# Patient Record
Sex: FEMALE | Race: WHITE
[De-identification: names, ages, dates, MRNs, and addresses within clinical notes are randomized per-mention and may not be internally consistent; named-entity substitution may affect disease eponyms.]

---

## 2017-10-19 NOTE — HP
Amended report to enter cosigning physician.



CC:  Dr. Cheryl Mejia*

 

HISTORY AND PHYSICAL:

 

DATE OF ADMISSION/SURGERY:  10/27/17

 

PRIMARY CARE PHYSICIAN:  Cheryl Mejia MD

 

ATTENDING PHYSICIAN:  Gisella Atkins MD* (dictated by ESSIE Gallagher).

 

CHIEF COMPLAINT:  Microcalcifications of the right breast.

 

HISTORY OF PRESENT ILLNESS:  Ms. Holland is a pleasant 57-year-old female, who 
presented to our office at the [facility] for evaluation of right 
breast microcalcification.  The patient apparently had her annual mammogram in 
late August of this year that revealed a small cluster of microcalcification in 
the right breast.  The patient went on and had stereotactic biopsy, but 
unfortunately, she was not able to tolerate that prone position for an extended 
period of time for which that procedure was aborted and the patient was not 
able to get any tissue biopsy done.  Because of her incomplete stereotactic 
biopsy, she was referred back to our office for evaluation of 
microcalcification of the right breast.  The patient herself denies any breast 
symptoms.  She does her monthly self-breast exam on a regular basis and denies 
feeling any lumps, masses, skin changes or lesions, nipple discharge or any 
other concerns.  She otherwise is a relatively healthy, middle-aged female, who 
is G1, P1 with 1 live child, menarche at age 13, and had a surgical menopause 
via hysterectomy at age 41 as well as oophorectomies.  She used hormone 
replacement therapy for a period of time between 2004 and 2012.  She is a 
nonsmoker, who consumes caffeine on a moderate basis.  The patient was seen by 
Dr. Atkins earlier this month and given the need for tissue biopsy, we 
discussed with her proceeding with a mammogram-guided needle localization and 
excision of the right breast microcalcification.  After going through the 
rationale, indication, risks and benefits, the patient consented to proceed 
with surgery and presented to the office today, to answer some questions and 
for a formal history and physical prior to her anticipated surgery.  She denies 
any changes since her last office visit.

 

PAST MEDICAL HISTORY:  Her medical history is significant for rheumatoid 
arthritis. She denies any history of lung, liver, heart, or kidney disease.

 

PAST SURGICAL HISTORY:  Significant for hysterectomy with salpingo-oophorectomy 
at age 41.

 

CURRENT MEDICATIONS:

1.  Celebrex 1 capsule on a daily basis and has been avoiding ibuprofen and 
other NSAIDs.

 

2.  Plaquenil 200 mg 2 tablets daily.

3.  Melatonin 3 mg 1 tablet q.h.s.

4.  Voltaren 1% ointment apply as needed to the joints of the hand for pain.

5.  Plaquenil 200 mg by mouth daily.

6.  Melatonin.

 

ALLERGIES:  She is allergic to DEMEROL and COMPAZINE.

 

FAMILY HISTORY:  Significant for arthritis, had a sister with rheumatoid 
arthritis as well.

 

SOCIAL HISTORY:  The patient is a nonsmoker, who consumes alcohol rarely.  She 
exercises regularly and caffeine intake is moderate.

 

REVIEW OF SYSTEMS:  See HPI, otherwise negative.  She denies any headache, 
dizziness, blurred vision, or double vision.  No cough, sore throat, shortness 
of breath, palpitation, or chest pain.  She denies any back pain, flank pain, 
dysuria, hematuria, or urinary frequency.  Denies any breast changes, lumps or 
masses, or nipple discharge.  No fever, chills, night sweats, or recent weight 
loss.

 

                               PHYSICAL EXAMINATION

 

GENERAL:  She is a pleasant, healthy-appearing, middle-aged female, in no acute 
distress or discomfort at this time today.

 

VITAL SIGNS:  Revealed temperature of 97.6, blood pressure of 120/76, pulse of 
72, respirations of 16.

 

HEENT:  Sclerae anicteric.  PERRLA.  EOMs intact.  Oropharynx is pink, moist 
with no exudate.

 

NECK:  Supple.  Trachea midline.  No cervical adenopathy, thyromegaly, or JVD.

 

LUNGS:  Clear to auscultation bilaterally.

 

HEART:  Regular rate and rhythm.  Normal S1 and S2 without rubs, murmurs, or 
gallops.

 

BACK:  Normal curvature.  No CVA tenderness.

 

BREAST EXAM:  The breast exam was performed earlier this month by Dr. Atkins 
and was done in both supine and sitting positions.  Breasts were symmetrical.  
They are normal to palpation without any masses or lumps noted.  No visible or 
palpable cervical or supraclavicular lymphadenopathy.  There was no nipple 
discharge or any skin changes noted.

 

ABDOMEN:  Soft, nontender, and nondistended.  No hernias, masses, or 
hepatosplenomegaly.

 

EXTREMITIES:  Without cyanosis, clubbing, or edema.

 

RECTAL:  Exam deferred at this time.

 

NEUROLOGIC:  Grossly intact.

 

 IMPRESSION:  A 57-year-old female with annual mammogram revealing a small 
cluster of microcalcification in the right breast.

 

PLAN:  Unfortunately, Ms. Holland was unable to tolerate the stereotactic 
biopsy due the need for her to sit still for a prolonged period of time and 
therefore, she opted for surgical biopsy.  The patient will be scheduled for a 
mammogram-guided needle localization and excision of right breast 
microcalcification to be performed by Dr. Atkins later this month.  The 
rationale, indication, risks, and benefits of surgery were discussed with her 
today.  Risks include, but not limited to, infection, bleeding, or injury to 
adjacent structures.  She seems to be well informed and wishes to proceed as 
outlined.  We will plan to follow her up accordingly.

 

 ____________________________________ 

ESSIE GALLAGHER

 

[id number]/CPS #: [id number]

MTDLULA

## 2017-10-27 ENCOUNTER — HOSPITAL ENCOUNTER (OUTPATIENT)
Dept: HOSPITAL 25 - SDS | Age: 57
Discharge: HOME | End: 2017-10-27
Attending: SURGERY
Payer: COMMERCIAL

## 2017-10-27 VITALS — DIASTOLIC BLOOD PRESSURE: 70 MMHG | SYSTOLIC BLOOD PRESSURE: 114 MMHG

## 2017-10-27 DIAGNOSIS — R92.0: Primary | ICD-10-CM

## 2017-10-27 DIAGNOSIS — K21.9: ICD-10-CM

## 2017-10-27 DIAGNOSIS — M06.9: ICD-10-CM

## 2017-10-27 PROCEDURE — 88307 TISSUE EXAM BY PATHOLOGIST: CPT

## 2017-10-27 NOTE — RAD
Indication: Indeterminate calcifications.



Calcifications in the right breast was localized in the inferior lateral quadrant of right

breast.



Under mammographic guidance the usual aseptic technique calcifications in the right breast

were localized. A 5 cm needle was placed within the calcifications. Confirmation of needle

placement was performed.



Specimen radiograph demonstrates the localized calcifications to be within the specimen.



IMPRESSION: Successful mammographically guided localization of calcification in the

lateral inferior left breast.

## 2017-10-27 NOTE — SURGPN
Brief Operative Note





- Surgery


Procedures: 





10/27/17


Op Note


Pre-op dx: microcalcifications right breast


Post-op dx: same


Procedure:  needle localization excision right breast microcalcifications.


Surgeon: Wilbert


Asst:  none


Anesth:  local-MAC


EBL:  3 cc


Complications:  none


SCDs on during surgery


Abx:  given pre-op 


Pt. tolerated procedure well and was transferred to  in a stable condition.


CLFoster

## 2017-10-28 NOTE — OP
CC:  [facility]; Dr. Cheryl Mejia

 

OPERATIVE SUMMARY:

 

DATE OF OPERATION:  10/27/17

 

DATE OF BIRTH:  [birth year]

 

SURGEON:  Gisella Atkins MD

 

ASSISTANT:  There was no assistant for this case.

 

PRE-OP DIAGNOSIS:  Microcalcifications, right breast.

 

POST-OP DIAGNOSIS:  Microcalcifications, right breast.

 

OPERATIVE PROCEDURE:  Needle localization, excision of right breast calcifications.

 

INDICATIONS:  Ms. Holland is a 57-year-old woman recently diagnosed with microcalcifications, not am
enable to stereotactic biopsy.  This prompted the plan for surgical intervention.  On the morning of
 surgery, she underwent needle localization without difficulty.

 

DESCRIPTION OF PROCEDURE:  She was then brought to the operating room and placed on the OR table in 
a supine position and given IV sedation.  The right breast was prepped and draped in the usual steri
le fashion, taking care not to dislodge the localizing wire.  After infiltrating with local anesthet
ic, a curvilinear elliptical incision was made encompassing the wire.  Subcutaneous tissue was divid
ed with electrocautery to excise the mass of tissue from around the wire. This was then marked in th
e usual fashion and handed off as a specimen.  Hemostasis was assured with electrocautery and the wo
und was irrigated with saline.  Once the hemostasis was adequate, closure was accomplished after ins
tilling some additional local into the cavity.  A 3-0 Polysorb was used to reapproximate the subcuta
neous tissue and the skin was closed with 4-0 Prolene in a subcuticular fashion.  Steri- Strips and 
a dry sterile dressing were applied.  All sponge and instrument counts were correct.  The patient to
lerated the procedure well and was transferred to Recovery in a stable condition.

 

 [id number]/CPS #: [id number]

## 2019-04-30 ENCOUNTER — HOSPITAL ENCOUNTER (EMERGENCY)
Dept: HOSPITAL 25 - ED | Age: 59
Discharge: LEFT BEFORE DISCHARGE | End: 2019-04-30
Payer: COMMERCIAL

## 2019-04-30 VITALS — DIASTOLIC BLOOD PRESSURE: 92 MMHG | SYSTOLIC BLOOD PRESSURE: 147 MMHG

## 2019-04-30 DIAGNOSIS — T50.905A: ICD-10-CM

## 2019-04-30 DIAGNOSIS — R50.2: Primary | ICD-10-CM

## 2019-04-30 DIAGNOSIS — Z53.21: ICD-10-CM
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
## 2019-04-30 NOTE — XMS REPORT
Continuity of Care Document (CCD)

 Created on:2019



Patient:Monica Holland

Sex:Female

:1960

External Reference #:[id number]





Demographics







 Address  [address] [place] [address]

 

 Mobile Phone  [phone]

 

 Email Address  [email or web address]

 

 Preferred Language  en

 

 Marital Status  Not  or 

 

 Jehovah's witness Affiliation  Unknown

 

 Race  White

 

 Ethnic Group  Not  or 









Author







 Name  Valentina Garay









Support







 Name  Relationship  Address  Phone

 

 Sidney Holland  Unavailable  Unavailable  [phone]









Care Team Providers







 Name  Role  Phone

 

 Cheryl Mejia MD  Primary Care Physician  Unavailable









Payers







 Date  Identification Numbers  Payment Provider  Subscriber

 

   Policy Number: [id number]  [employer]  Sidney Holland









 PayID: [id number]  [address] [address]









 [place] [address]







Advance Directives







 Description

 

 No Information Available







Problems







 Description

 

 No Information







Family History







 Date  Family Member(s)  Observation  Comments

 

   General  Arthritis  

 

   General  sister has Ra  

 

   General  Colitis  

 

   General  Rheumatoid Arthritis  

 

   Father  Congestive Heart Failure (CHF)  

 

   Father  Hypertension  

 

   Mother  Diabetes  

 

   Mother  Diabetes Type I  

 

   Mother  Congestive Heart Failure (CHF)  

 

   Mother  Smoker  







Social History







 Type  Date  Description  Comments

 

 Birth Sex    Unknown  

 

 ETOH Use    Rarely consumes alcohol  

 

 ETOH Use    Rarely consumes wine  

 

 ETOH Use    Consumes 1 glass of wine per week  

 

 ETOH Use    consumes 1-2 glasses of wine per  



     week  

 

 Tobacco Use  Start: Unknown  Patient has never smoked  

 

 Smoking Status  Reviewed: 19  Patient has never smoked  

 

 Exercise Type/Frequency    Exercises regularly  

 

 Exercise Type/Frequency    SHe rides her horse daily and she  



     hikes  







Allergies, Adverse Reactions, Alerts







 Active Allergies  Reaction  Severity  Comments  Date

 

 Demerol  Nausea and Vomiting      11/15/2016

 

 Compazine  Anaphylaxis  Severe    11/15/2016







Medications







 Active Medications  SIG  Qnty  Indications  Ordering  Date



         Provider  

 

 Dicyclomine HCL  Take one  30caps    Kai Hoffmann,  2019



             10mg  capsule/tablet      M.D.  



 Capsules  by mouth twice        



   daily as needed        



   for GI cramping        



   and IBS symptoms        

 

 Plaquenil  Take 2 By Mouth  180tabs  M06.4  Kai Hoffmann,  2016



       200mg Tablets  Daily Ongoing      M.D.  



           

 

 Melatonin  1 by mouth every      Unknown  



       5 Capsules  night at bedtime        



           

 

 Ibuprofen 200  400-600mg every      Unknown  



           200mg Tablets  6 hours as        



   needed for pain.        

 

 Calcium + D3  1 by mouth every      Unknown  



          600-200mg-Unit  day        



 Tablets          



           

 

 Collagen - Vit C        Unknown  

 

 Hyaluronic Acid        Unknown  



             120          



 Capsules          



           

 

 Diphenhydramine HCL  1 tab at at      Unknown  



                 25mg  bedtime        



 Capsules          



           

 

 B-Complex  1 by mouth every      Unknown  



        Capsules  day        



           

 

 Gabapentin  1 by mouth three      Unknown  



        300mg Capsules  times a day prn        



           









 History Medications









 Cosentyx  inject 150mg  2ml  M45.0  Kai Hoffmann,  2019 -



   subcutaneously every      M.D.  2019



 150mg/ml Soln  month        



 Prefill Syringe          



           

 

 Cosentyx  inject 300mg sc at  2ml  M45.0  Kai Hoffmann,  2019 -



 Sensoready Pen  weeks 0, 1, 2, 3, 4      M.D.  2019



   (loading dose), then        



 150mg/ml Solution  begin maintenance        



 Auto-Inject  dose)        



           

 

 Cosentyx  For maintenance,  2units  M45.0  Kai Hoffmann,  2019 -



 Sensoready 300  inject two pens      M.D.  2019



 Dose  subcutaneously once a        



        150mg/ml  month. refrigerate.        



 Solution  allow 15 to 30        



 Auto-Inject  minutes at room temp        



   prior to        



   administration.        

 

 Cosentyx  inject 150 mg sc at  5units  M45.0  Kai Hoffmann,  2019 -



 Sensoready Pen  weeks 0, 1, 2, 3, 4      M.D.  2019



   (loading dose), then        



 150mg/ml Solution  begin maintenance        



 Auto-Inject  dose) monthly        



           

 

 Prednisone  take 4 tabs by mouth  30tabs    Kai Hoffmann,  2019 -



              10mg  daily for 2 days then      M.D.  2019



 Tablets  3 tabs daily for 2        



   days then 2 tabs for        



   2 days then 1 tab for        



   2 days then d/c        

 

 Prednisone  take 4 tabs by mouth  30tabs    Kai Hoffmann,  10/03/2018 -



              10mg  daily for 2 days then      M.D.  2019



 Tablets  3 tabs daily for 2        



   days then 2 tabs for        



   2 days then 1 tab for        



   2 days then d/c        

 

 Aspercreme  apply twice daily to  6units  M45.0  Kai Hoffmann,  2018 -



 W/Lidocaine  the painful joints as      M.D.  2019



               4%  needed        



 Cream          



           

 

 Flector  apply 1 patch up to  30units  M45.0  Kai Hoffmann,  2018 -



           1.3%  twice dailiy as      M.D.  2019



 Patches  needed for pain        



           

 

 Flector  apply 1 patch up to  30units  M45.0  Kai Hoffmann,  2018 -



           1.3%  twice dailiy as      M.D.  2018



 Patches  needed for pain        



           

 

 Enbrel Sureclick  Inject 50 MG (1 ML)  3.92units    Kai Hoffmann,  2018 
-



   Under The Skin Every      M.D.  2019



  50mg/ml Solution  Week        



 Auto-Inject          



           

 

 Hydrocodone-Aceta  1-2 tabs by mouth  15tabs    Jose Manuel Millard,  10/19/2017 -



 minophen  every 6 hours as      EDON SAWANT  Unknown



   needed for pain        



 5-325mg Tablets          



           

 

 Voltaren  apply 2 grams twice  200units    Kai Hoffmann,  2017 -



            1% Gel  daily as needed for      M.D.  2019



   pain to the hands        

 

 Celebrex  Take One  180caps    Kai Hoffmann,  2017 -



            200mg  Capsule/Tablet Daily      M.D.  2019



 Capsules  By Mouth as Needed        



   For Pain, Avoid        



   Ibuprofen And Other        



   NSAIDS        

 

 Meloxicam  take one tab twice      Kai Hoffmann,  2017 -



             7.5mg  daily as needed for      M.D.  2017



 Tablets  pain, avoid other        



   nsaids        

 

 Meloxicam  take one tab twice  180tabs    Kai Hoffmann,  2017 -



             7.5mg  daily as needed for      M.D.  2017



 Tablets  pain        



           

 

 Meloxicam  take one tab twice  180tabs    Kai Hoffmann,  2017 -



             7.5mg  daily as needed for      M.D.  2017



 Tablets  pain        



           

 

 Calcium With D        Unknown   -



           Unknown

 

 Multi Vitamin  1 by mouth every day      Unknown   -



           2017



 Tablets          



           

 

 Meloxicam  Take one  90tabs    Shun,   -



             7.5mg  capsule/tablet by      MD Kai  2017



 Tablets  mouth twice daily as        



   needed for pain        

 

 Fish Oil  2 by mouth every day      Unknown   -



            435mg          Unknown



 Capsules          



           

 

 Turmeric        Unknown   -



            450mg          Unknown



 Capsules          



           







Medications Administered in Office







 Medication  SIG  Qnty  Indications  Ordering Provider  Date

 

 Triamcinolone (Kenalog)        Kai Hoffmann M.D.  2019



            Injection          



           

 

 No Injection        Kai Hoffmann M.D.  2018



 Injection          

 

 Celestone 3 mg and 3mg        Evens Lunsford MD  2018



           Injection          



           

 

 Celestone 3 mg and 3mg        Evens Lunsford MD  2018



           Injection          



           







Immunizations







 CPT Code  Status  Date  Vaccine  Reaction  Lot #

 

 [id number]  Given  2019  Pneumococcal Conjugate  no immediate reaction  [id number]



       Vaccine 13 Valent For    



       Intramuscular Use    

 

 55065  Given  2018  Influenza Virus Vaccine,  no immediate reaction  
[id number]



       Quadrivalent, Split,  noted  



       Preservative Free    







Vital Signs







 Date  Vital  Result  Comment

 

 2019  9:44am  Height  64.5 inches  5'4.50"









 Weight  136.00 lb  

 

 Heart Rate  90 /min  

 

 BP Systolic Sitting  120 mmHg  

 

 BP Diastolic Sitting  70 mmHg  

 

 Respiratory Rate  14 /min  

 

 BMI (Body Mass Index)  23.0 kg/m2  









 2019 10:46am  Height  64.5 inches  5'4.50"









 Weight  141.38 lb  

 

 Heart Rate  74 /min  

 

 BP Systolic  118 mmHg  

 

 BP Diastolic  72 mmHg  

 

 Pain Level  4  

 

 O2 % BldC Oximetry  98 %  

 

 BMI (Body Mass Index)  23.9 kg/m2  









 2019  9:35am  Height  64.5 inches  5'4.50"









 Weight  138.00 lb  

 

 Heart Rate  69 /min  

 

 BP Systolic Sitting  118 mmHg  right arm, regular cuff, sitting

 

 BP Diastolic Sitting  68 mmHg  right arm, regular cuff, sitting

 

 BP Systolic Standing  112 mmHg  right arm, regular cuff, standing

 

 BP Diastolic Standing  64 mmHg  right arm, regular cuff, standing

 

 BP Systolic Lying Down  128 mmHg  

 

 BP Diastolic Lying Down  66 mmHg  

 

 Respiratory Rate  16 /min  

 

 O2 % BldC Oximetry  99 %  

 

 BMI (Body Mass Index)  23.3 kg/m2  

 

 Ejection Fraction  19  55-60%









 2019  9:20am  Height  64.5 inches  5'4.50"









 Weight  138.00 lb  

 

 Heart Rate  80 /min  

 

 BP Systolic Sitting  124 mmHg  

 

 BP Diastolic Sitting  82 mmHg  

 

 Respiratory Rate  14 /min  

 

 Pain Level  0  

 

 BMI (Body Mass Index)  23.3 kg/m2  









 2018  2:57pm  Height  64.5 inches  5'4.50"









 Weight  138.00 lb  

 

 Heart Rate  86 /min  

 

 BP Systolic  126 mmHg  

 

 BP Diastolic  80 mmHg  

 

 Pain Level  4  

 

 O2 % BldC Oximetry  98 %  

 

 BMI (Body Mass Index)  23.3 kg/m2  









 2018  4:38pm  Height  64.5 inches  5'4.50"









 Weight  133.00 lb  

 

 Heart Rate  70 /min  

 

 BP Systolic Sitting  127 mmHg  

 

 BP Diastolic Sitting  80 mmHg  

 

 Respiratory Rate  14 /min  

 

 Pain Level  2  

 

 BMI (Body Mass Index)  22.5 kg/m2  









 2018  8:10am  Height  64.5 inches  5'4.50"









 Weight  134.38 lb  

 

 Heart Rate  74 /min  

 

 BP Systolic Sitting  136 mmHg  

 

 BP Diastolic Sitting  80 mmHg  

 

 Pain Level  5  

 

 O2 % BldC Oximetry  99 %  

 

 BMI (Body Mass Index)  22.7 kg/m2  









 2018  2:13pm  Height  64.5 inches  5'4.50"









 Weight  134.00 lb  

 

 Heart Rate  84 /min  

 

 BP Systolic  140 mmHg  

 

 BP Diastolic  86 mmHg  

 

 Respiratory Rate  20 /min  

 

 Pain Level  6  

 

 BMI (Body Mass Index)  22.6 kg/m2  









 2017 10:30am  Heart Rate  78 /min  









 Respiratory Rate  16 /min  

 

 Body Temperature  97.7 F  









 10/19/2017  1:09pm  Height  65.5 inches  5'5.50"









 Weight  133.00 lb  

 

 Heart Rate  72 /min  

 

 BP Systolic  120 mmHg  

 

 BP Diastolic  76 mmHg  

 

 Respiratory Rate  16 /min  

 

 Body Temperature  97.6 F  

 

 BMI (Body Mass Index)  21.8 kg/m2  









 10/10/2017  1:27pm  Height  65.5 inches  5'5.50"









 Weight  133.00 lb  

 

 Heart Rate  72 /min  

 

 BP Systolic  142 mmHg  

 

 BP Diastolic  86 mmHg  

 

 Respiratory Rate  16 /min  

 

 Body Temperature  98.1 F  

 

 BMI (Body Mass Index)  21.8 kg/m2  









 2017  8:01am  Height  63.5 inches  5'3.50"









 Weight  137.00 lb  

 

 Heart Rate  72 /min  

 

 BP Systolic Sitting  150 mmHg  

 

 BP Diastolic Sitting  90 mmHg  

 

 Respiratory Rate  14 /min  

 

 Pain Level  4  

 

 BMI (Body Mass Index)  23.9 kg/m2  









 2017  9:03am  Height  63.5 inches  5'3.50"









 Weight  141.38 lb  

 

 Heart Rate  65 /min  

 

 BP Systolic Sitting  134 mmHg  

 

 BP Diastolic Sitting  86 mmHg  

 

 Respiratory Rate  14 /min  

 

 Body Temperature  96.9 F  

 

 Pain Level  5  

 

 BMI (Body Mass Index)  24.6 kg/m2  









 2017  9:38am  Height  63.5 inches  5'3.50"









 Weight  142.00 lb  

 

 Heart Rate  64 /min  

 

 BP Systolic Sitting  110 mmHg  

 

 BP Diastolic Sitting  60 mmHg  

 

 Respiratory Rate  14 /min  

 

 Body Temperature  96.7 F  

 

 Pain Level  3  

 

 BMI (Body Mass Index)  24.8 kg/m2  









 2016  8:57am  Height  63.5 inches  5'3.50"









 Weight  144.25 lb  

 

 Heart Rate  64 /min  

 

 BP Systolic Sitting  120 mmHg  

 

 BP Diastolic Sitting  80 mmHg  

 

 Respiratory Rate  14 /min  

 

 Body Temperature  96.3 F  

 

 Pain Level  1  

 

 BMI (Body Mass Index)  25.1 kg/m2  









 11/15/2016  3:29pm  Height  63.5 inches  5'3.50"









 Weight  142.00 lb  

 

 Heart Rate  80 /min  

 

 BP Systolic Sitting  120 mmHg  

 

 BP Diastolic Sitting  84 mmHg  

 

 Body Temperature  97.6 F  

 

 Pain Level  1  

 

 BMI (Body Mass Index)  24.8 kg/m2  







Results







 Test  Date  Facility  Test  Result  H/L  Range  Note

 

 Laboratory test  2019  [facility]  C Reactive  < 1.00 mg/L  N
  <8.01  



 finding    [address]  Protein        



     [place] [address] [phone]          









 Erythrocyte Sed Rate  16 mm/Hr  N  0-30  









 Laboratory test  10/27/2017  [facility]  Surgical  SEE RESULT      1



 finding    [address]  Pathology  [place]      



     [place] [address] [phone]          









 1  SEE RESULT BELOW



   -----------------------------------------------------------------------------
---------------



   Name:  MONICA HOLLAND                : 1960    Attend Dr: Gisella Atkins MD



   Acct:  [id number]  Unit: [id number]  AGE: 57            Location:  [facility]



   Reg:   10/27/17                        SEX: F             [place]    [place] [facility]



   -----------------------------------------------------------------------------
---------------



   



   SPEC: [id number]            ILIANA: 10/27/      [facility] DR: Gisella Atkins MD



   REQ:  [id number]             RECD: 10/27/



   STATUS: SOUT



   _



   ORDERED:  LEVEL 5



   



   FINAL DIAGNOSIS



   



   



   Breast, right, needle localization lumpectomy:



   -- Benign breast tissue with non-proliferative fibrocystic change and 
associated



   microcalcifications.



   -- No evidence of invasive or in situ carcinoma.



   



   



   



   PRE-OPERATIVE DIAGNOSIS



   



   Mammographic microcalcifications found on diagnostic imaging of breast, 
usual markings.



   



   GROSS DESCRIPTION



   



   The specimen is received fresh labeled, Right Breast Tissue, Usual Markings, 
and consists of



   a 6.7 x 5.5 x 2.4 cm yellow-pink ovoid portion of fibrofatty soft tissue 
with three attached



   sutures which are designated as follows: long-lateral, short-superior and 
medium-medial.



   The specimen is partially surfaced by a 3.3 x 0.4 cm tan-white wrinkled skin 
ellipse on the



   inferior anterior surface.  There is a needle localization wire entering the 
specimen



   through the skin ellipse and extending towards the central specimen.  The 
tissue associated



   with the localization wire is focally hemorrhagic.  A discrete lesion is not 
identified.



   The remaining cut surface consists predominantly of yellow lobulated adipose 
tissue with



   scant interspersed tan-white fibrous tissue.  The specimen is inked as 
follows: superior



   anterior-blue, inferior anterior-green and deep-black, serially sectioned 
from lateral to



   medial and representative sections are submitted in cassettes A through I to 
include area



   associated with wire in cassettes C and D.



   



   Signed __________(signature on file)___________ Marcie Brown MD 10/
31/17 1011



   



   -----------------------------------------------------------------------------
---------------



   



   



   



   



   



   ** END OF REPORT **



   



   * ML=Testing performed at Main Lab



   DEPARTMENT OF PATHOLOGY,  [address] [place] [address]



   Phone # [phone]      Fax #[phone]



   Tj Argueta M.D. Director     [facility] # [id number]







Procedures







 Date  Code  Description  Status

 

 2019  86927  Admin Of Inj  Completed

 

 2019  58163  Stress ECHO Interpretation/Report Hospital  Completed

 

 2019  06865  Treadmill Interp/Report Only  Completed

 

 2019  13145  Stress Test Supervsn W/Out I/R  Completed

 

 2019  40817  EKG, Interpretation Only  Completed

 

 2019  67626  ECHO Transthorasic Realtime 2D W Doppler & Color Flow  
Completed



     Hosp  

 

 2019  80214  ECHO Transthorasic Realtime 2D W Doppler & Color Flow  
Completed



     Hosp  

 

 2018  42430  Trigger PT Inj(S) Single Or Multiple Points 1 Or 2  
Completed



     Muscles  

 

 2018  04880  Inject Tendon Sheath Or Ligament Aponeurosis Eg  Completed



     Plantar Fascia  

 

 2018  07408  Inject Tendon Sheath Or Ligament Aponeurosis Eg  Completed



     Plantar Fascia  

 

 2018  [id number]  Diabetic Retinal Eye Exam  Completed

 

 10/27/2017  98366  Excise Breast Lesion Single,Identified By Pre-Op  Completed



     Radiolog Marker  

 

 09/15/2017  [id number]  Mammogram  Completed

 

 2017  [id number]  Mammogram  Completed

 

 2017  [id number]  Diabetic Retinal Eye Exam  Completed







Encounters







 Type  Date  Location  Provider  Dx  Diagnosis

 

 Office Visit  2019  Rheumatology  MARKOS Flores.0  Ankylosing



   10:40a  [facility]    spondylitis of



           multiple sites in



           spine









 Z79.899  Other long term (current) drug therapy

 

 M54.5  Low back pain

 

 M65.331  Trigger finger, right middle finger









 Office Visit  2019 10:00a  Esdras ADLER  R07.9  Chest pain,



     Cardiology  VIKRAM West    unspecified









 I49.3  Ventricular premature depolarization









 Office Visit  2019  9:00a  Rheumatology  Kai LEAHY5.0  Ankylosing



     [facility] M.D[facility]    spondylitis of



           multiple sites in



           spine









 Z79.899  Other long term (current) drug therapy

 

 R07.9  Chest pain, unspecified

 

 M54.5  Low back pain

 

 Z23  Encounter for immunization









 Office Visit  2018  3:00p  Rheumatology  Kai  M45.0  Ankylosing



     [facility] M[facility]    spondylitis of



           multiple sites in



           spine









 Z79.899  Other long term (current) drug therapy

 

 M54.5  Low back pain

 

 M06.4  Inflammatory polyarthropathy

 

 M79.10  Myalgia, unspecified site









 Office Visit  2018  4:40p  Rheumatology  Kai  M45.0  Ankylosing



     [facility] M[facility]    spondylitis of



           multiple sites in



           spine









 Z79.899  Other long term (current) drug therapy

 

 M54.5  Low back pain

 

 M65.331  Trigger finger, right middle finger









 Office Visit  2018  Rheumatology  Kai  M06.4  Inflammatory



   8:20a  [facility]    polyarthropathy









 Z79.899  Other long term (current) drug therapy

 

 M46.1  Sacroiliitis, not elsewhere classified

 

 R76.0  Raised antibody titer









 Office Visit  2018  1:15p  Orthopedic  Evens  M65.331  Trigger



     [facility] Luns[facility] MD    finger, right



           middle finger









 M65.332  Trigger finger, left middle finger









 Office Visit  10/10/2017  Surgical  Gisella Hawk  R92.0  Mammographic



   1:30p  [facility] MD    microcalcification



           found on dx imaging of



           brst

 

 Office Visit  2017  Rheumatology  Kai  M06.4  Inflammatory



   8:00a  [facility] M[facility]    polyarthropathy









 Z79.899  Other long term (current) drug therapy

 

 M54.5  Low back pain

 

 M65.30  Trigger finger, unspecified finger









 Office Visit  2017  Rheumatology  Kai  M06.4  Inflammatory



   9:00a  [facility] M[facility]    polyarthropathy









 Z79.899  Other long term (current) drug therapy

 

 M54.5  Low back pain

 

 M65.30  Trigger finger, unspecified finger









 Office Visit  2017  Rheumatology  Kai  M06.4  Inflammatory



   9:40a  [facility]    polyarthropathy









 Z79.899  Other long term (current) drug therapy









 Office Visit  2016  Rheumatology  Kai  M06.4  Inflammatory



   9:00a  [facility]    polyarthropathy









 M46.1  Sacroiliitis, not elsewhere classified

 

 R76.0  Raised antibody titer









 Office Visit  11/15/2016  Rheumatology  Kai  M06.4  Inflammatory



   3:00p  [facility] M[facility]    polyarthropathy









 R20.8  Other disturbances of skin sensation

 

 M54.5  Low back pain

 

 R76.0  Raised antibody titer







Plan of Treatment

Future Appointment(s):2019  8:20 am - Kai Hoffmann M.D. at Rheumatology 
[facility]2019 - Kai Hoffmann M.D.M45.0 Ankylosing spondylitis of 
multiple sites in zmzqhZ51.5 Low back painR79.82 Elevated C-reactive protein (
CRP)K58.8 Other irritable bowel syndrome